# Patient Record
Sex: FEMALE | Race: WHITE
[De-identification: names, ages, dates, MRNs, and addresses within clinical notes are randomized per-mention and may not be internally consistent; named-entity substitution may affect disease eponyms.]

---

## 2019-09-13 ENCOUNTER — HOSPITAL ENCOUNTER (EMERGENCY)
Dept: HOSPITAL 60 - LB.ED | Age: 54
Discharge: HOME | End: 2019-09-13
Payer: COMMERCIAL

## 2019-09-13 DIAGNOSIS — H81.20: Primary | ICD-10-CM

## 2019-09-13 PROCEDURE — 85025 COMPLETE CBC W/AUTO DIFF WBC: CPT

## 2019-09-13 PROCEDURE — 84443 ASSAY THYROID STIM HORMONE: CPT

## 2019-09-13 PROCEDURE — 96374 THER/PROPH/DIAG INJ IV PUSH: CPT

## 2019-09-13 PROCEDURE — 99284 EMERGENCY DEPT VISIT MOD MDM: CPT

## 2019-09-13 PROCEDURE — 93005 ELECTROCARDIOGRAM TRACING: CPT

## 2019-09-13 PROCEDURE — 36415 COLL VENOUS BLD VENIPUNCTURE: CPT

## 2019-09-13 PROCEDURE — 84484 ASSAY OF TROPONIN QUANT: CPT

## 2019-09-13 PROCEDURE — 80053 COMPREHEN METABOLIC PANEL: CPT

## 2019-09-13 PROCEDURE — 96361 HYDRATE IV INFUSION ADD-ON: CPT

## 2019-09-13 NOTE — EDM.PDOC
ED HPI GENERAL MEDICAL PROBLEM





- General


Chief Complaint: Neuro Symptoms/Deficits


Stated Complaint: DIZZY


Time Seen by Provider: 09/13/19 13:04


Source of Information: Reports: Patient, Family


History Limitations: Reports: No Limitations





- History of Present Illness


INITIAL COMMENTS - FREE TEXT/NARRATIVE: 





This is a 55yo F here for severe vertigo. She states her symptoms started 

yesterday and occurred 5 different times. It did happen once during her shift 

at work. She would rest and it would improve. She notes the episode today was 

very severe and she was unable to walk properly and open her eyes due to the 

vertigo. She states the room spins. She denies any recent illness or other 

concerns. She has no past medical history or any medications. 


Onset: Sudden


Duration: Day(s):, Intermittent


Location: Reports: Generalized


Severity: Severe


Improves with: Reports: Rest


Worsens with: Reports: Movement





- Related Data


 Allergies











Allergy/AdvReac Type Severity Reaction Status Date / Time


 


No Known Allergies Allergy   Verified 09/13/19 13:18











Home Meds: 


 Home Meds





predniSONE [Prednisone] See Taper PO DAILY 9 Days #40 tablet 09/13/19 [Rx]











ED ROS ENT





- Review of Systems


Review Of Systems: ROS reveals no pertinent complaints other than HPI.





ED EXAM, ENT





- Physical Exam


Exam: See Below


Exam Limited By: No Limitations


General Appearance: Alert, WD/WN, Moderate Distress


Eye Exam: Bilateral Eye: EOMI, Nystagmus (horizontal torsional)


Ears: Normal External Exam


Nose: Normal Inspection


Mouth/Throat: Normal Inspection


Head: Atraumatic, Normocephalic


Neck: Normal Inspection


Respiratory/Chest: No Respiratory Distress, Lungs Clear


Cardiovascular: Normal Peripheral Pulses, Regular Rate, Rhythm


Back: Normal Inspection


Extremities: Normal Inspection


Neurological: Alert, Oriented, CN II-XII Intact, Normal Reflexes, No Motor/

Sensory Deficits


Psychiatric: Normal Affect, Normal Mood


Skin: Warm, Dry, Intact





Course





- Vital Signs


Last Recorded V/S: 





 Last Vital Signs











Temp  36.7 C   09/13/19 13:08


 


Pulse  59 L  09/13/19 13:08


 


Resp  16   09/13/19 13:08


 


BP  151/61 H  09/13/19 13:08


 


Pulse Ox  100   09/13/19 13:08














- Orders/Labs/Meds


Orders: 





 Active Orders 24 hr











 Category Date Time Status


 


 EKG Documentation Completion [RC] ASDIRECTED Care  09/13/19 13:07 Active


 


 Meclizine [Antivert] Med  09/13/19 13:19 Active





 25 mg PO DAILY PRN   








 Medication Orders





Meclizine HCl (Antivert)  25 mg PO DAILY PRN


   PRN Reason: Dizziness








Labs: 





 Laboratory Tests











  09/13/19 09/13/19 09/13/19 Range/Units





  13:06 13:10 13:10 


 


WBC   6.4   (4.0-11.0)  K/uL


 


RBC   4.55   (3.80-5.80)  M/uL


 


Hgb   13.1   (11.5-16.5)  g/dL


 


Hct   39.3   (37.0-47.0)  %


 


MCV   86   (76-96)  fL


 


MCH   28.8   (27.0-32.0)  pg


 


MCHC   33.3   (31.0-35.0)  g/dL


 


RDW   13.5   (11.0-16.0)  %


 


Plt Count   169   (150-500)  K/uL


 


MPV   10.5 H   (6.0-10.0)  fL


 


Neut % (Auto)   38.3 L   (45.0-70.0)  %


 


Lymph % (Auto)   48.4 H   (20.0-40.0)  %


 


Mono % (Auto)   12.1 H   (3.0-10.0)  %


 


Eos % (Auto)   0.9 L   (1.0-5.0)  %


 


Baso % (Auto)   0.3   (0.0-0.5)  %


 


Neut # (Auto)   2.45   (2.00-7.50)  K/uL


 


Lymph # (Auto)   3.11   (1.50-4.00)  K/uL


 


Mono # (Auto)   0.78   (0.20-0.80)  K/uL


 


Eos # (Auto)   0.06   (0.04-0.40)  K/uL


 


Baso # (Auto)   0.02   (0.02-0.10)  K/uL


 


Sodium    144  (136-145)  mmol/L


 


Potassium    4.0  (3.5-5.1)  mmol/L


 


Chloride    106  ()  mmol/L


 


Carbon Dioxide    25.1  (21.0-32.0)  mmol/L


 


Anion Gap    16.9 H  (5.0-15.0)  mmol/L


 


BUN    14  (8-26)  mg/dL


 


Creatinine    0.92  (0.55-1.02)  mg/dL


 


Est Cr Clr Drug Dosing    2.66  mL/min


 


Estimated GFR (MDRD)    > 60  (>60)  MLS/MIN


 


BUN/Creatinine Ratio    15.2  (6-25)  


 


Glucose    155 H  ()  mg/dL


 


Calcium    8.5  (8.5-10.1)  mg/dL


 


Total Bilirubin    0.3  (0.0-1.0)  mg/dL


 


AST    4 L  (15-37)  U/L


 


ALT    15  (12-78)  U/L


 


Alkaline Phosphatase    83  ()  U/L


 


Troponin I  < 0.017    (0.000-0.060)  ng/mL


 


Total Protein    6.8  (6.4-8.2)  g/dL


 


Albumin    3.2 L  (3.4-5.0)  g/dL


 


Globulin    3.6  (2.2-4.2)  g/dL


 


Albumin/Globulin Ratio    0.9  (0.8-2.0)  


 


TSH, Ultra Sensitive  2.868    (0.358-3.740)  uIU/mL











Meds: 





Medications











Generic Name Dose Route Start Last Admin





  Trade Name Freq  PRN Reason Stop Dose Admin


 


Meclizine HCl  25 mg  09/13/19 13:19  





  Antivert  PO   





  DAILY PRN   





  Dizziness   





     





     





     














Discontinued Medications














Generic Name Dose Route Start Last Admin





  Trade Name Freq  PRN Reason Stop Dose Admin


 


Sodium Chloride  1,000 mls @ 999 mls/hr  09/13/19 13:08  09/13/19 13:21





  Normal Saline  IV  09/13/19 14:08  999 mls/hr





  .BOLUS ONE   Administration





     





     





     





     


 


Meclizine HCl  Confirm  09/13/19 13:22  09/13/19 13:21





  Antivert  Administered  09/13/19 13:23  25 mg





  Dose   Administration





  25 mg   





  .ROUTE   





  .STK-MED ONE   





     





     





     





     


 


Methylprednisolone Sodium Succinate  Confirm  09/13/19 14:53  





  Solu-Medrol  Administered  09/13/19 14:54  





  Dose   





  125 mg   





  .ROUTE   





  .STK-MED ONE   





     





     





     





     














- Re-Assessments/Exams


Free Text/Narrative Re-Assessment/Exam: 


Consulted Neurology and discussed imaging and follow up and differential. Plan 

of care for work up and imaging to be discussed with patient and family. 

Discussed likely peripheral neuritis ie Vestibular Neuritis but would discuss 

risks of infarction and hemorrhage and need for MRI with patient. 





Departure





- Departure


Time of Disposition: 14:50


Disposition: Home, Self-Care 01


Condition: Fair


Clinical Impression: 


Acute vestibular neuritis


Qualifiers:


 Laterality: unspecified laterality Qualified Code(s): H81.20 - Vestibular 

neuronitis, unspecified ear








- Discharge Information


Prescriptions: 


predniSONE [Prednisone] See Taper PO DAILY 9 Days #40 tablet


Instructions:  Meclizine chewable tablets, Ondansetron oral dissolving tablet, 

Vertigo, Easy-to-Read, Dizziness, Easy-to-Read, Prednisone tablets


Referrals: 


PCP,None [Primary Care Provider] - 


Forms:  ED Department Discharge


Additional Instructions: 


Take the prednisone as prescribed. Followup in the clinic or ER as needed and 

if the symptoms persist or get worse. The symptoms should get better within 5-7 

days. 





- Problem List & Annotations


(1) Acute vestibular neuritis


SNOMED Code(s): 197997717, 968579390, 162957401


   Code(s): H81.20 - VESTIBULAR NEURONITIS, UNSPECIFIED EAR   Status: Acute   


Qualifiers: 


   Laterality: unspecified laterality   Qualified Code(s): H81.20 - Vestibular 

neuronitis, unspecified ear   





- Problem List Review


Problem List Initiated/Reviewed/Updated: Yes





- My Orders


Last 24 Hours: 





My Active Orders





09/13/19 13:07


EKG Documentation Completion [RC] ASDIRECTED 





09/13/19 13:19


Meclizine [Antivert]   25 mg PO DAILY PRN 














- Assessment/Plan


Last 24 Hours: 





My Active Orders





09/13/19 13:07


EKG Documentation Completion [RC] ASDIRECTED 





09/13/19 13:19


Meclizine [Antivert]   25 mg PO DAILY PRN 











Plan: 


Patient is a travelling nurse. Discussed differential including stroke and 

hemorrhage vs neuritis. Discussed no symptoms of weakness, ipsilateral 

sensation changes, slurred speech, visual disturbance other than nystagmus that 

does improve with focusing on an object, and other signs and symptoms. Patient 

counseled on MRI and risks of stroke. Patient will go home with  but 

monitor carefully and f/u in ER/Neurology as needed if symptoms persist or 

worsen. Patient counseled on medications and solumedrol. Discussed side effects 

and patient acknowledges understanding. Patient agrees with close monitoring 

and f/u and understands the risks of waiting to obtain an MRI at a later time. 

Patient to f/u with PCP and Neurology as discussed and ER as needed.

## 2021-11-08 ENCOUNTER — HOSPITAL ENCOUNTER (EMERGENCY)
Dept: HOSPITAL 60 - LB.ED | Age: 56
Discharge: HOME | End: 2021-11-08
Payer: COMMERCIAL

## 2021-11-08 DIAGNOSIS — N39.0: Primary | ICD-10-CM

## 2021-11-08 PROCEDURE — 99283 EMERGENCY DEPT VISIT LOW MDM: CPT

## 2021-11-08 NOTE — EDM.PDOC
ED HPI GENERAL MEDICAL PROBLEM





- General


Chief Complaint: Genitourinary Problem


Stated Complaint: UTI


Time Seen by Provider: 11/08/21 20:15


Source of Information: Reports: Patient, RN Notes Reviewed


History Limitations: Reports: No Limitations





- History of Present Illness


Onset: Today, Gradual


Location: Reports: Pelvis


Quality: Reports: Burning, Pressure


Severity: Moderate


Improves with: Reports: None


Worsens with: Reports: None





- Related Data


                                    Allergies











Allergy/AdvReac Type Severity Reaction Status Date / Time


 


No Known Allergies Allergy   Verified 09/13/19 13:18











Home Meds: 


                                    Home Meds





predniSONE [Prednisone] See Taper PO DAILY 9 Days #40 tablet 09/13/19 [Rx]











ED ROS GENERAL





- Review of Systems


Review Of Systems: See Below


Constitutional: Denies: Fever, Chills


HEENT: Reports: No Symptoms


Respiratory: Reports: No Symptoms


Cardiovascular: Reports: No Symptoms


GI/Abdominal: Reports: No Symptoms


: Reports: Dysuria, Frequency, Hematuria, Urgency


Musculoskeletal: Reports: No Symptoms


Skin: Reports: No Symptoms


Neurological: Reports: No Symptoms





ED EXAM, RENAL/





- Physical Exam


Exam: See Below


Exam Limited By: No Limitations


General Appearance: Alert, No Apparent Distress


Eye Exam: Bilateral Eye: PERRL


Ears: Normal External Exam


Nose: Normal Inspection


Head: Atraumatic, Normocephalic


Neck: Normal Inspection, Full Range of Motion


Respiratory/Chest: No Respiratory Distress, No Accessory Muscle Use


Back Exam: CVA Tenderness (R), CVA Tenderness (L)


Extremities: Normal Inspection


Neurological: Alert, Oriented


Skin Exam: Warm, Dry, Intact





Departure





- Departure


Time of Disposition: 20:30


Disposition: Home, Self-Care 01


Condition: Good


Clinical Impression: 


 UTI, Urinary tract infectious disease








- Discharge Information